# Patient Record
Sex: FEMALE | Race: OTHER | HISPANIC OR LATINO | Employment: OTHER | URBAN - METROPOLITAN AREA
[De-identification: names, ages, dates, MRNs, and addresses within clinical notes are randomized per-mention and may not be internally consistent; named-entity substitution may affect disease eponyms.]

---

## 2023-03-15 LAB
ALANINE AMINOTRANSFERASE (SGPT) (U/L) IN SER/PLAS: 81 U/L (ref 7–45)
ALBUMIN (G/DL) IN SER/PLAS: 4.3 G/DL (ref 3.4–5)
ALKALINE PHOSPHATASE (U/L) IN SER/PLAS: 146 U/L (ref 33–136)
ANION GAP IN SER/PLAS: 12 MMOL/L (ref 10–20)
ASPARTATE AMINOTRANSFERASE (SGOT) (U/L) IN SER/PLAS: 68 U/L (ref 9–39)
BASOPHILS (10*3/UL) IN BLOOD BY AUTOMATED COUNT: 0.03 X10E9/L (ref 0–0.1)
BASOPHILS/100 LEUKOCYTES IN BLOOD BY AUTOMATED COUNT: 0.4 % (ref 0–2)
BILIRUBIN TOTAL (MG/DL) IN SER/PLAS: 0.6 MG/DL (ref 0–1.2)
CALCIUM (MG/DL) IN SER/PLAS: 9.6 MG/DL (ref 8.6–10.3)
CARBON DIOXIDE, TOTAL (MMOL/L) IN SER/PLAS: 27 MMOL/L (ref 21–32)
CHLORIDE (MMOL/L) IN SER/PLAS: 105 MMOL/L (ref 98–107)
CREATININE (MG/DL) IN SER/PLAS: 0.76 MG/DL (ref 0.5–1.05)
EOSINOPHILS (10*3/UL) IN BLOOD BY AUTOMATED COUNT: 0.05 X10E9/L (ref 0–0.7)
EOSINOPHILS/100 LEUKOCYTES IN BLOOD BY AUTOMATED COUNT: 0.6 % (ref 0–6)
ERYTHROCYTE DISTRIBUTION WIDTH (RATIO) BY AUTOMATED COUNT: 14.2 % (ref 11.5–14.5)
ERYTHROCYTE MEAN CORPUSCULAR HEMOGLOBIN CONCENTRATION (G/DL) BY AUTOMATED: 32.3 G/DL (ref 32–36)
ERYTHROCYTE MEAN CORPUSCULAR VOLUME (FL) BY AUTOMATED COUNT: 89 FL (ref 80–100)
ERYTHROCYTES (10*6/UL) IN BLOOD BY AUTOMATED COUNT: 4.67 X10E12/L (ref 4–5.2)
GFR FEMALE: 86 ML/MIN/1.73M2
GLUCOSE (MG/DL) IN SER/PLAS: 86 MG/DL (ref 74–99)
HEMATOCRIT (%) IN BLOOD BY AUTOMATED COUNT: 41.5 % (ref 36–46)
HEMOGLOBIN (G/DL) IN BLOOD: 13.4 G/DL (ref 12–16)
IMMATURE GRANULOCYTES/100 LEUKOCYTES IN BLOOD BY AUTOMATED COUNT: 0.3 % (ref 0–0.9)
LEUKOCYTES (10*3/UL) IN BLOOD BY AUTOMATED COUNT: 7.8 X10E9/L (ref 4.4–11.3)
LYMPHOCYTES (10*3/UL) IN BLOOD BY AUTOMATED COUNT: 1.97 X10E9/L (ref 1.2–4.8)
LYMPHOCYTES/100 LEUKOCYTES IN BLOOD BY AUTOMATED COUNT: 25.4 % (ref 13–44)
MONOCYTES (10*3/UL) IN BLOOD BY AUTOMATED COUNT: 0.56 X10E9/L (ref 0.1–1)
MONOCYTES/100 LEUKOCYTES IN BLOOD BY AUTOMATED COUNT: 7.2 % (ref 2–10)
NEUTROPHILS (10*3/UL) IN BLOOD BY AUTOMATED COUNT: 5.13 X10E9/L (ref 1.2–7.7)
NEUTROPHILS/100 LEUKOCYTES IN BLOOD BY AUTOMATED COUNT: 66.1 % (ref 40–80)
NRBC (PER 100 WBCS) BY AUTOMATED COUNT: 0 /100 WBC (ref 0–0)
PLATELETS (10*3/UL) IN BLOOD AUTOMATED COUNT: 399 X10E9/L (ref 150–450)
POTASSIUM (MMOL/L) IN SER/PLAS: 4.3 MMOL/L (ref 3.5–5.3)
PROTEIN TOTAL: 7.2 G/DL (ref 6.4–8.2)
SODIUM (MMOL/L) IN SER/PLAS: 140 MMOL/L (ref 136–145)
UREA NITROGEN (MG/DL) IN SER/PLAS: 16 MG/DL (ref 6–23)

## 2023-05-18 LAB
MUCUS, URINE: ABNORMAL /LPF
RBC, URINE: 8 /HPF (ref 0–5)
WBC, URINE: 1 /HPF (ref 0–5)

## 2023-06-21 ENCOUNTER — OFFICE VISIT (OUTPATIENT)
Dept: PRIMARY CARE | Facility: CLINIC | Age: 68
End: 2023-06-21
Payer: MEDICARE

## 2023-06-21 VITALS
HEIGHT: 63 IN | BODY MASS INDEX: 21.83 KG/M2 | DIASTOLIC BLOOD PRESSURE: 68 MMHG | SYSTOLIC BLOOD PRESSURE: 122 MMHG | WEIGHT: 123.2 LBS

## 2023-06-21 DIAGNOSIS — F51.01 PRIMARY INSOMNIA: ICD-10-CM

## 2023-06-21 DIAGNOSIS — J45.909 ASTHMA, UNSPECIFIED ASTHMA SEVERITY, UNSPECIFIED WHETHER COMPLICATED, UNSPECIFIED WHETHER PERSISTENT (HHS-HCC): Primary | ICD-10-CM

## 2023-06-21 DIAGNOSIS — I10 BENIGN ESSENTIAL HTN: ICD-10-CM

## 2023-06-21 PROBLEM — N39.3 STRESS INCONTINENCE, FEMALE: Status: ACTIVE | Noted: 2023-06-21

## 2023-06-21 PROBLEM — R16.0 LIVER MASS: Status: ACTIVE | Noted: 2023-06-21

## 2023-06-21 PROBLEM — M54.40 LOW BACK PAIN WITH SCIATICA: Status: ACTIVE | Noted: 2023-06-21

## 2023-06-21 PROBLEM — H11.153 PINGUECULA OF BOTH EYES: Status: ACTIVE | Noted: 2023-06-21

## 2023-06-21 PROBLEM — H16.223 KERATOCONJUNCTIVITIS SICCA OF BOTH EYES NOT DUE TO SJOGREN'S SYNDROME: Status: ACTIVE | Noted: 2023-06-21

## 2023-06-21 PROBLEM — H52.03 HYPERMETROPIA OF BOTH EYES: Status: ACTIVE | Noted: 2023-06-21

## 2023-06-21 PROBLEM — G47.00 INSOMNIA: Status: ACTIVE | Noted: 2023-06-21

## 2023-06-21 PROBLEM — M51.9 ANNULAR TEAR: Status: ACTIVE | Noted: 2023-06-21

## 2023-06-21 PROBLEM — K21.9 CHRONIC GERD: Status: ACTIVE | Noted: 2023-06-21

## 2023-06-21 PROBLEM — R10.32 CHRONIC LLQ PAIN: Status: ACTIVE | Noted: 2023-06-21

## 2023-06-21 PROBLEM — F17.200 CURRENT EVERY DAY SMOKER: Status: ACTIVE | Noted: 2023-06-21

## 2023-06-21 PROBLEM — G89.29 CHRONIC LLQ PAIN: Status: ACTIVE | Noted: 2023-06-21

## 2023-06-21 PROBLEM — R29.898 LOWER EXTREMITY WEAKNESS: Status: ACTIVE | Noted: 2023-06-21

## 2023-06-21 PROBLEM — R63.4 WEIGHT LOSS: Status: ACTIVE | Noted: 2023-06-21

## 2023-06-21 PROBLEM — M13.0 ARTHRITIS OF MULTIPLE SITES: Status: ACTIVE | Noted: 2023-06-21

## 2023-06-21 PROBLEM — H02.88B MEIBOMIAN GLAND DYSFUNCTION (MGD) OF UPPER AND LOWER EYELID OF LEFT EYE: Status: ACTIVE | Noted: 2023-06-21

## 2023-06-21 PROBLEM — E55.9 VITAMIN D DEFICIENCY: Status: ACTIVE | Noted: 2023-06-21

## 2023-06-21 PROBLEM — R10.9 ABDOMINAL PAIN: Status: ACTIVE | Noted: 2023-06-21

## 2023-06-21 PROBLEM — R91.1 LUNG NODULE: Status: ACTIVE | Noted: 2023-06-21

## 2023-06-21 PROBLEM — M85.88 OTHER SPECIFIED DISORDERS OF BONE DENSITY AND STRUCTURE, OTHER SITE: Status: ACTIVE | Noted: 2023-06-21

## 2023-06-21 PROBLEM — F41.9 ANXIETY: Status: ACTIVE | Noted: 2023-06-21

## 2023-06-21 PROBLEM — H02.88A MEIBOMIAN GLAND DYSFUNCTION (MGD) OF UPPER AND LOWER EYELID OF RIGHT EYE: Status: ACTIVE | Noted: 2023-06-21

## 2023-06-21 PROBLEM — R51.9 CHRONIC HEADACHES: Status: ACTIVE | Noted: 2023-06-21

## 2023-06-21 PROBLEM — R73.9 HYPERGLYCEMIA: Status: ACTIVE | Noted: 2023-06-21

## 2023-06-21 PROBLEM — M54.16 LUMBAR RADICULOPATHY: Status: ACTIVE | Noted: 2023-06-21

## 2023-06-21 PROBLEM — H52.13 MYOPIA OF BOTH EYES: Status: ACTIVE | Noted: 2023-06-21

## 2023-06-21 PROBLEM — N39.41 URGE INCONTINENCE OF URINE: Status: ACTIVE | Noted: 2023-06-21

## 2023-06-21 PROBLEM — F17.200 TOBACCO DEPENDENCE SYNDROME: Status: ACTIVE | Noted: 2023-06-21

## 2023-06-21 PROBLEM — H25.13 CATARACT, NUCLEAR SCLEROTIC, BOTH EYES: Status: ACTIVE | Noted: 2023-06-21

## 2023-06-21 PROBLEM — R79.89 ELEVATED LFTS: Status: ACTIVE | Noted: 2023-06-21

## 2023-06-21 PROBLEM — G89.29 CHRONIC HEADACHES: Status: ACTIVE | Noted: 2023-06-21

## 2023-06-21 PROBLEM — E78.5 HYPERLIPIDEMIA: Status: ACTIVE | Noted: 2023-06-21

## 2023-06-21 PROCEDURE — 3074F SYST BP LT 130 MM HG: CPT | Performed by: STUDENT IN AN ORGANIZED HEALTH CARE EDUCATION/TRAINING PROGRAM

## 2023-06-21 PROCEDURE — 1160F RVW MEDS BY RX/DR IN RCRD: CPT | Performed by: STUDENT IN AN ORGANIZED HEALTH CARE EDUCATION/TRAINING PROGRAM

## 2023-06-21 PROCEDURE — 3078F DIAST BP <80 MM HG: CPT | Performed by: STUDENT IN AN ORGANIZED HEALTH CARE EDUCATION/TRAINING PROGRAM

## 2023-06-21 PROCEDURE — 1159F MED LIST DOCD IN RCRD: CPT | Performed by: STUDENT IN AN ORGANIZED HEALTH CARE EDUCATION/TRAINING PROGRAM

## 2023-06-21 PROCEDURE — 99214 OFFICE O/P EST MOD 30 MIN: CPT | Performed by: STUDENT IN AN ORGANIZED HEALTH CARE EDUCATION/TRAINING PROGRAM

## 2023-06-21 RX ORDER — TOPIRAMATE 50 MG/1
50 TABLET, FILM COATED ORAL
COMMUNITY
Start: 2016-11-03 | End: 2023-06-21 | Stop reason: ALTCHOICE

## 2023-06-21 RX ORDER — GABAPENTIN 300 MG/1
CAPSULE ORAL
COMMUNITY
Start: 2016-11-03 | End: 2024-01-29 | Stop reason: ALTCHOICE

## 2023-06-21 RX ORDER — MULTIVIT-MIN/IRON FUM/FOLIC AC 7.5 MG-4
TABLET ORAL
COMMUNITY

## 2023-06-21 RX ORDER — AMLODIPINE BESYLATE 5 MG/1
5 TABLET ORAL DAILY
COMMUNITY
End: 2023-06-21 | Stop reason: SDUPTHER

## 2023-06-21 RX ORDER — OMEPRAZOLE 40 MG/1
40 CAPSULE, DELAYED RELEASE ORAL DAILY
COMMUNITY
End: 2024-01-29 | Stop reason: SDUPTHER

## 2023-06-21 RX ORDER — ZOLPIDEM TARTRATE 10 MG/1
TABLET ORAL
COMMUNITY
End: 2023-06-21 | Stop reason: SDUPTHER

## 2023-06-21 RX ORDER — ATORVASTATIN CALCIUM 40 MG/1
40 TABLET, FILM COATED ORAL NIGHTLY
COMMUNITY
End: 2024-01-29 | Stop reason: SDUPTHER

## 2023-06-21 RX ORDER — BUDESONIDE AND FORMOTEROL FUMARATE DIHYDRATE 160; 4.5 UG/1; UG/1
2 AEROSOL RESPIRATORY (INHALATION) 2 TIMES DAILY
Qty: 30.6 G | Refills: 3 | Status: SHIPPED | OUTPATIENT
Start: 2023-06-21 | End: 2024-06-20

## 2023-06-21 RX ORDER — IBUPROFEN 200 MG
TABLET ORAL
COMMUNITY
Start: 2023-06-01 | End: 2023-06-21 | Stop reason: ALTCHOICE

## 2023-06-21 RX ORDER — OXYCODONE AND ACETAMINOPHEN 5; 325 MG/1; MG/1
TABLET ORAL EVERY 6 HOURS PRN
COMMUNITY
Start: 2021-04-17 | End: 2023-06-21 | Stop reason: ALTCHOICE

## 2023-06-21 RX ORDER — FLUOXETINE HYDROCHLORIDE 20 MG/1
60 CAPSULE ORAL DAILY
COMMUNITY
End: 2024-01-29 | Stop reason: SDUPTHER

## 2023-06-21 RX ORDER — ENALAPRIL MALEATE 20 MG/1
20 TABLET ORAL DAILY
Qty: 90 TABLET | Refills: 3 | Status: SHIPPED | OUTPATIENT
Start: 2023-06-21 | End: 2024-01-29 | Stop reason: SDUPTHER

## 2023-06-21 RX ORDER — ALBUTEROL SULFATE 90 UG/1
AEROSOL, METERED RESPIRATORY (INHALATION)
COMMUNITY
Start: 2017-10-30 | End: 2023-06-21 | Stop reason: SDUPTHER

## 2023-06-21 RX ORDER — DICLOFENAC SODIUM 75 MG/1
75 TABLET, DELAYED RELEASE ORAL DAILY
COMMUNITY
Start: 2021-07-27 | End: 2024-01-29 | Stop reason: ALTCHOICE

## 2023-06-21 RX ORDER — AMLODIPINE BESYLATE 5 MG/1
5 TABLET ORAL DAILY
Qty: 90 TABLET | Refills: 3 | Status: SHIPPED | OUTPATIENT
Start: 2023-06-21 | End: 2024-01-29 | Stop reason: SDUPTHER

## 2023-06-21 RX ORDER — BUDESONIDE AND FORMOTEROL FUMARATE DIHYDRATE 160; 4.5 UG/1; UG/1
2 AEROSOL RESPIRATORY (INHALATION) 2 TIMES DAILY
COMMUNITY
End: 2023-06-21 | Stop reason: SDUPTHER

## 2023-06-21 RX ORDER — ALBUTEROL SULFATE 90 UG/1
2 AEROSOL, METERED RESPIRATORY (INHALATION) EVERY 6 HOURS PRN
Qty: 18 G | Refills: 2 | Status: SHIPPED | OUTPATIENT
Start: 2023-06-21 | End: 2024-04-24

## 2023-06-21 RX ORDER — OLANZAPINE 10 MG/1
1 TABLET ORAL NIGHTLY
COMMUNITY
Start: 2016-11-03 | End: 2024-01-29 | Stop reason: SDUPTHER

## 2023-06-21 RX ORDER — ZOLPIDEM TARTRATE 10 MG/1
TABLET ORAL
Qty: 90 TABLET | Refills: 1 | Status: SHIPPED | OUTPATIENT
Start: 2023-06-21 | End: 2023-12-18

## 2023-06-21 RX ORDER — MELOXICAM 7.5 MG/1
1 TABLET ORAL DAILY
COMMUNITY
Start: 2017-05-03 | End: 2024-01-29 | Stop reason: ALTCHOICE

## 2023-06-21 RX ORDER — METHYLPREDNISOLONE 4 MG/1
TABLET ORAL
COMMUNITY
Start: 2021-04-17 | End: 2023-06-21 | Stop reason: ALTCHOICE

## 2023-06-21 RX ORDER — ENALAPRIL MALEATE 20 MG/1
1 TABLET ORAL DAILY
COMMUNITY
Start: 2019-05-20 | End: 2023-06-21 | Stop reason: SDUPTHER

## 2023-06-21 ASSESSMENT — PAIN SCALES - GENERAL: PAINLEVEL: 0-NO PAIN

## 2023-06-21 NOTE — PROGRESS NOTES
"Subjective   Patient ID: Taylor Cardoso is a 68 y.o. female who presents for Med Refill (Medication review and refills).    HPI comes in for 6-month follow-up for insomnia.  Overall no changes    Review of Systems  Constitutional: NO F, chills, or sweats  Eyes: no blurred vision or visual disturbance  ENT: no hearing loss, no congestion, no nasal discharge, no hoarseness and no sore throat.   Cardiovascular: no chest pain, no edema, no palps and no syncope.   Respiratory: no cough,no s.o.b. and no wheezing  Gastrointestinal: no abdominal pain, No C/D no N/V, no blood in stools  Genitourinary: no dysuria, no change in urinary frequency, no urinary hesitancy and no feelings of urinary urgency.   Musculoskeletal: no arthralgias,  no back pain and no myalgias.   Integumentary: no new skin lesions and no rashes.   Neurological: no difficulty walking, no headache, no limb weakness, no numbness and no tingling.   Psychiatric: Chronic insomnia stable on meds  Objective   /68 (BP Location: Left arm, Patient Position: Sitting, BP Cuff Size: Adult)   Ht 1.6 m (5' 3\")   Wt 55.9 kg (123 lb 3.2 oz)   BMI 21.82 kg/m²     Physical Exam  gen- a & o x 3, nad, pleasant  heent- eomi, perrla, ear canals patent, TM's non-erythematous, no fluid, frontal and maxillary sinus's nontender  neck- supple, nontender, no palpable or enlarged nodes, no thyromegaly  heart- rrr, no murmurs  lungs- cta b/l , no w/r/r  chest- symmetric, nontender  ab- soft, nontender, no palpable organomegaly, postive bowel sounds  ex's- no c/c/e  neuro- CNs 2-12 grossly intact, full sensation and strength in all extremities    Assessment/Plan     1.  6-month follow-up for insomnia.  Med refill today.  [place]AR[place] report reviewed.  She is up-to-date with drug screen, and patient agreement forms as of December 2022.  You have been prescribed a schedule 4 controlled substance for sleep, examples are ambien, sonata, restoril, lunesta, and belsomra. The long term use " of this medication can be addicting, however is needed in some conditions. By law, you can be prescribed a 1 month supply with 5 refills. After that if refills are needed there will be a required office visit, there will be no exceptions. You will be drug tested on a regular basis. A prescriber report will be ran each time you need a refill. You may not use any illegal drugs, and may not get controlled medications prescribed from more than 1 doctor. If there are any abnormalities on your drug testing, and if any other physicians are found to have been prescribing you controlled substances, our office will no longer help with refills in the future. There will be no exceptions. It is your responsibility to take your medications properly, it is your responsibility to make office visits in advance when refills may be needed, and it is your responsibility to not take any illegal drugs, as well as any controlled medications from another doctor. If you fail in your responsibilities, our office will discontinue your medication with no questions and no exceptions.  If you feel that you are addicted to your medication and would like to improve yourself and cut back, you may contact the [place] [facility], Their number [phone] toll-free. Your call is confidential. They will refer you to someone for help. You can call them from Monday through Friday between 8 a.m. and 5 p.m.    Additional med refills for hypertension and asthma.    Follow-up in 6 months for wellness visit

## 2023-06-21 NOTE — PATIENT INSTRUCTIONS
1.  6-month follow-up for insomnia.  Med refill today.  OARRS report reviewed.  She is up-to-date with drug screen, and patient agreement forms as of December 2022.  You have been prescribed a schedule 4 controlled substance for sleep, examples are ambien, sonata, restoril, lunesta, and belsomra. The long term use of this medication can be addicting, however is needed in some conditions. By law, you can be prescribed a 1 month supply with 5 refills. After that if refills are needed there will be a required office visit, there will be no exceptions. You will be drug tested on a regular basis. A prescriber report will be ran each time you need a refill. You may not use any illegal drugs, and may not get controlled medications prescribed from more than 1 doctor. If there are any abnormalities on your drug testing, and if any other physicians are found to have been prescribing you controlled substances, our office will no longer help with refills in the future. There will be no exceptions. It is your responsibility to take your medications properly, it is your responsibility to make office visits in advance when refills may be needed, and it is your responsibility to not take any illegal drugs, as well as any controlled medications from another doctor. If you fail in your responsibilities, our office will discontinue your medication with no questions and no exceptions.  If you feel that you are addicted to your medication and would like to improve yourself and cut back, you may contact the [place] [facility], Their number [phone] toll-free. Your call is confidential. They will refer you to someone for help. You can call them from Monday through Friday between 8 a.m. and 5 p.m.    Additional med refills for hypertension and asthma.    Follow-up in 6 months for wellness visit

## 2023-11-01 ENCOUNTER — NURSE TRIAGE (OUTPATIENT)
Dept: PRIMARY CARE | Facility: CLINIC | Age: 68
End: 2023-11-01
Payer: MEDICARE

## 2023-12-18 DIAGNOSIS — F51.01 PRIMARY INSOMNIA: ICD-10-CM

## 2023-12-18 RX ORDER — NICOTINE 10 MG/ML
SPRAY, METERED NASAL
COMMUNITY
Start: 2023-07-11 | End: 2024-01-29 | Stop reason: ALTCHOICE

## 2023-12-18 RX ORDER — NICOTINE 7 MG/24H
1 PATCH, EXTENDED RELEASE TRANSDERMAL DAILY
COMMUNITY
Start: 2023-08-11 | End: 2024-01-29 | Stop reason: ALTCHOICE

## 2023-12-18 RX ORDER — ZOLPIDEM TARTRATE 10 MG/1
TABLET ORAL
Qty: 30 TABLET | Refills: 0 | Status: SHIPPED | OUTPATIENT
Start: 2023-12-18 | End: 2024-01-29 | Stop reason: SDUPTHER

## 2024-01-29 ENCOUNTER — OFFICE VISIT (OUTPATIENT)
Dept: PRIMARY CARE | Facility: CLINIC | Age: 69
End: 2024-01-29
Payer: MEDICARE

## 2024-01-29 VITALS
DIASTOLIC BLOOD PRESSURE: 56 MMHG | WEIGHT: 121.2 LBS | HEART RATE: 81 BPM | OXYGEN SATURATION: 95 % | SYSTOLIC BLOOD PRESSURE: 106 MMHG | BODY MASS INDEX: 21.47 KG/M2

## 2024-01-29 DIAGNOSIS — Z51.81 ENCOUNTER FOR THERAPEUTIC DRUG LEVEL MONITORING: ICD-10-CM

## 2024-01-29 DIAGNOSIS — Z13.29 SCREENING FOR THYROID DISORDER: ICD-10-CM

## 2024-01-29 DIAGNOSIS — E55.9 VITAMIN D DEFICIENCY: ICD-10-CM

## 2024-01-29 DIAGNOSIS — R73.9 HYPERGLYCEMIA: ICD-10-CM

## 2024-01-29 DIAGNOSIS — I10 BENIGN ESSENTIAL HTN: ICD-10-CM

## 2024-01-29 DIAGNOSIS — Z00.00 WELLNESS EXAMINATION: Primary | ICD-10-CM

## 2024-01-29 DIAGNOSIS — F41.9 ANXIETY: ICD-10-CM

## 2024-01-29 DIAGNOSIS — Z12.31 ENCOUNTER FOR SCREENING MAMMOGRAM FOR MALIGNANT NEOPLASM OF BREAST: ICD-10-CM

## 2024-01-29 DIAGNOSIS — I10 ESSENTIAL HYPERTENSION WITH GOAL BLOOD PRESSURE LESS THAN 130/80: ICD-10-CM

## 2024-01-29 DIAGNOSIS — K21.9 GASTROESOPHAGEAL REFLUX DISEASE, UNSPECIFIED WHETHER ESOPHAGITIS PRESENT: ICD-10-CM

## 2024-01-29 DIAGNOSIS — Z13.6 ENCOUNTER FOR SCREENING FOR CARDIOVASCULAR DISORDERS: ICD-10-CM

## 2024-01-29 DIAGNOSIS — F51.01 PRIMARY INSOMNIA: ICD-10-CM

## 2024-01-29 DIAGNOSIS — R91.8 MULTIPLE PULMONARY NODULES: ICD-10-CM

## 2024-01-29 DIAGNOSIS — E78.5 HYPERLIPIDEMIA, UNSPECIFIED HYPERLIPIDEMIA TYPE: ICD-10-CM

## 2024-01-29 DIAGNOSIS — Z13.0 SCREENING FOR DEFICIENCY ANEMIA: ICD-10-CM

## 2024-01-29 PROBLEM — M62.81 MUSCLE WEAKNESS OF EXTREMITY: Status: ACTIVE | Noted: 2023-06-21

## 2024-01-29 PROBLEM — M19.90 ARTHRITIS: Status: ACTIVE | Noted: 2023-06-21

## 2024-01-29 PROBLEM — N39.0 URINARY TRACT INFECTION: Status: ACTIVE | Noted: 2024-01-29

## 2024-01-29 PROBLEM — R53.83 FATIGUE: Status: ACTIVE | Noted: 2024-01-29

## 2024-01-29 PROBLEM — M89.9 DISORDER OF BONE: Status: ACTIVE | Noted: 2023-06-21

## 2024-01-29 PROBLEM — Z86.79 HISTORY OF HYPERTENSION: Status: ACTIVE | Noted: 2024-01-29

## 2024-01-29 PROBLEM — H16.229 KERATOCONJUNCTIVITIS SICCA NOT DUE TO SJOGREN'S SYNDROME: Status: ACTIVE | Noted: 2020-10-15

## 2024-01-29 PROBLEM — H52.03 HYPEROPIA OF BOTH EYES: Status: ACTIVE | Noted: 2020-10-15

## 2024-01-29 PROBLEM — H25.10 NUCLEAR SCLEROTIC CATARACT: Status: ACTIVE | Noted: 2020-10-15

## 2024-01-29 PROBLEM — H02.883 MEIBOMIAN GLAND DYSFUNCTION (MGD) OF RIGHT EYE: Status: ACTIVE | Noted: 2020-10-15

## 2024-01-29 PROCEDURE — 99214 OFFICE O/P EST MOD 30 MIN: CPT | Performed by: STUDENT IN AN ORGANIZED HEALTH CARE EDUCATION/TRAINING PROGRAM

## 2024-01-29 PROCEDURE — G0439 PPPS, SUBSEQ VISIT: HCPCS | Performed by: STUDENT IN AN ORGANIZED HEALTH CARE EDUCATION/TRAINING PROGRAM

## 2024-01-29 PROCEDURE — 1159F MED LIST DOCD IN RCRD: CPT | Performed by: STUDENT IN AN ORGANIZED HEALTH CARE EDUCATION/TRAINING PROGRAM

## 2024-01-29 PROCEDURE — 3074F SYST BP LT 130 MM HG: CPT | Performed by: STUDENT IN AN ORGANIZED HEALTH CARE EDUCATION/TRAINING PROGRAM

## 2024-01-29 PROCEDURE — 1170F FXNL STATUS ASSESSED: CPT | Performed by: STUDENT IN AN ORGANIZED HEALTH CARE EDUCATION/TRAINING PROGRAM

## 2024-01-29 PROCEDURE — 3078F DIAST BP <80 MM HG: CPT | Performed by: STUDENT IN AN ORGANIZED HEALTH CARE EDUCATION/TRAINING PROGRAM

## 2024-01-29 PROCEDURE — 1160F RVW MEDS BY RX/DR IN RCRD: CPT | Performed by: STUDENT IN AN ORGANIZED HEALTH CARE EDUCATION/TRAINING PROGRAM

## 2024-01-29 PROCEDURE — 1126F AMNT PAIN NOTED NONE PRSNT: CPT | Performed by: STUDENT IN AN ORGANIZED HEALTH CARE EDUCATION/TRAINING PROGRAM

## 2024-01-29 RX ORDER — OLANZAPINE 10 MG/1
10 TABLET ORAL NIGHTLY
Qty: 90 TABLET | Refills: 3 | Status: SHIPPED | OUTPATIENT
Start: 2024-01-29 | End: 2025-01-28

## 2024-01-29 RX ORDER — ZOLPIDEM TARTRATE 10 MG/1
TABLET ORAL
Qty: 90 TABLET | Refills: 1 | Status: SHIPPED | OUTPATIENT
Start: 2024-01-29 | End: 2024-04-24 | Stop reason: ALTCHOICE

## 2024-01-29 RX ORDER — ASPIRIN 81 MG/1
81 TABLET ORAL
COMMUNITY

## 2024-01-29 RX ORDER — OMEPRAZOLE 40 MG/1
40 CAPSULE, DELAYED RELEASE ORAL DAILY
Qty: 90 CAPSULE | Refills: 3 | Status: SHIPPED | OUTPATIENT
Start: 2024-01-29 | End: 2025-01-28

## 2024-01-29 RX ORDER — FLUOXETINE HYDROCHLORIDE 20 MG/1
60 CAPSULE ORAL DAILY
Qty: 270 CAPSULE | Refills: 3 | Status: SHIPPED | OUTPATIENT
Start: 2024-01-29 | End: 2024-04-18 | Stop reason: ALTCHOICE

## 2024-01-29 RX ORDER — ATORVASTATIN CALCIUM 40 MG/1
40 TABLET, FILM COATED ORAL NIGHTLY
Qty: 90 TABLET | Refills: 3 | Status: SHIPPED | OUTPATIENT
Start: 2024-01-29 | End: 2025-01-28

## 2024-01-29 RX ORDER — AMLODIPINE BESYLATE 5 MG/1
5 TABLET ORAL DAILY
Qty: 90 TABLET | Refills: 3 | Status: SHIPPED | OUTPATIENT
Start: 2024-01-29 | End: 2025-01-28

## 2024-01-29 RX ORDER — ENALAPRIL MALEATE 20 MG/1
20 TABLET ORAL DAILY
Qty: 90 TABLET | Refills: 3 | Status: SHIPPED | OUTPATIENT
Start: 2024-01-29 | End: 2025-01-28

## 2024-01-29 ASSESSMENT — ENCOUNTER SYMPTOMS: DEPRESSION: 0

## 2024-01-29 ASSESSMENT — PAIN SCALES - GENERAL: PAINLEVEL: 0-NO PAIN

## 2024-01-29 NOTE — PROGRESS NOTES
Subjective   Patient ID: Taylor Cardoso is a 68 y.o. female who presents for Medicare Annual Wellness Visit Subsequent (She is not fasting.) and Med Refill.    HPI comes in for Medicare wellness.  Med refills    Review of Systems  Constitutional: NO F, chills, or sweats  Eyes: no blurred vision or visual disturbance  ENT: no hearing loss, no congestion, no nasal discharge, no hoarseness and no sore throat.   Cardiovascular: no chest pain, no edema, no palps and no syncope.   Respiratory: no cough,no s.o.b. and no wheezing  Gastrointestinal: no abdominal pain, No C/D no N/V, no blood in stools  Genitourinary: no dysuria, no change in urinary frequency, no urinary hesitancy and no feelings of urinary urgency.   Musculoskeletal: no arthralgias,  no back pain and no myalgias.   Integumentary: no new skin lesions and no rashes.   Neurological: no difficulty walking, no headache, no limb weakness, no numbness and no tingling.   Psychiatric: Chronic insomnia  Endocrine: no recent weight gain and no recent weight loss.   Hematologic/Lymphatic: no tendency for easy bruising and no swollen glands.  Objective   /56 (BP Location: Right arm, Patient Position: Sitting, BP Cuff Size: Adult)   Pulse 81   Wt 55 kg (121 lb 3.2 oz)   SpO2 95%   BMI 21.47 kg/m²     Physical Exam  gen- a & o x 3, nad, pleasant  heent- eomi, perrla, ear canals patent, TM's non-erythematous, no fluid, frontal and maxillary sinus's nontender  neck- supple, nontender, no palpable or enlarged nodes, no thyromegaly  heart- rrr, no murmurs  lungs- cta b/l , no w/r/r  chest- symmetric, nontender  ab- soft, nontender, no palpable organomegaly, postive bowel sounds  ex's- no c/c/e  neuro- CNs 2-12 grossly intact, full sensation and strength in all extremities    Assessment/Plan        1.  Medicare wellness.  No concerns on exam.  Screening labs ordered today please be fasting.  She is up-to-date with GYN she had mammogram bone density scan.  Colonoscopy  was 2018.  Check with your pharmacy if you have had Prevnar 20 we would advise if you have not yet.  She is also established with the lung cancer screening clinic for once per year screenings.    2. 6-month follow-up for insomnia.  Med refill today.  OARRS report reviewed.  Drug screen and patient agreement forms today  You have been prescribed a schedule 4 controlled substance for sleep, examples are ambien, sonata, restoril, lunesta, and belsomra. The long term use of this medication can be addicting, however is needed in some conditions. By law, you can be prescribed a 1 month supply with 5 refills. After that if refills are needed there will be a required office visit, there will be no exceptions. You will be drug tested on a regular basis. A prescriber report will be ran each time you need a refill. You may not use any illegal drugs, and may not get controlled medications prescribed from more than 1 doctor. If there are any abnormalities on your drug testing, and if any other physicians are found to have been prescribing you controlled substances, our office will no longer help with refills in the future. There will be no exceptions. It is your responsibility to take your medications properly, it is your responsibility to make office visits in advance when refills may be needed, and it is your responsibility to not take any illegal drugs, as well as any controlled medications from another doctor. If you fail in your responsibilities, our office will discontinue your medication with no questions and no exceptions.  If you feel that you are addicted to your medication and would like to improve yourself and cut back, you may contact the [place] [facility], Their number [phone] toll-free. Your call is confidential. They will refer you to someone for help. You can call them from Monday through Friday between 8 a.m. and 5 p.m.

## 2024-01-29 NOTE — PATIENT INSTRUCTIONS
1.  Medicare wellness.  No concerns on exam.  Screening labs ordered today please be fasting.  She is up-to-date with GYN she had mammogram bone density scan.  Colonoscopy was 2018.  Check with your pharmacy if you have had Prevnar 20 we would advise if you have not yet.  She is also established with the lung cancer screening clinic for once per year screenings.    2. 6-month follow-up for insomnia.  Med refill today.  OARRS report reviewed.  Drug screen and patient agreement forms today  You have been prescribed a schedule 4 controlled substance for sleep, examples are ambien, sonata, restoril, lunesta, and belsomra. The long term use of this medication can be addicting, however is needed in some conditions. By law, you can be prescribed a 1 month supply with 5 refills. After that if refills are needed there will be a required office visit, there will be no exceptions. You will be drug tested on a regular basis. A prescriber report will be ran each time you need a refill. You may not use any illegal drugs, and may not get controlled medications prescribed from more than 1 doctor. If there are any abnormalities on your drug testing, and if any other physicians are found to have been prescribing you controlled substances, our office will no longer help with refills in the future. There will be no exceptions. It is your responsibility to take your medications properly, it is your responsibility to make office visits in advance when refills may be needed, and it is your responsibility to not take any illegal drugs, as well as any controlled medications from another doctor. If you fail in your responsibilities, our office will discontinue your medication with no questions and no exceptions.  If you feel that you are addicted to your medication and would like to improve yourself and cut back, you may contact the [place] [facility], Their number [phone] toll-free. Your call is  confidential. They will refer you to someone for help. You can call them from Monday through Friday between 8 a.m. and 5 p.m.

## 2024-01-30 ASSESSMENT — ENCOUNTER SYMPTOMS
LOSS OF SENSATION IN FEET: 0
OCCASIONAL FEELINGS OF UNSTEADINESS: 0

## 2024-01-30 ASSESSMENT — PATIENT HEALTH QUESTIONNAIRE - PHQ9
SUM OF ALL RESPONSES TO PHQ9 QUESTIONS 1 AND 2: 0
2. FEELING DOWN, DEPRESSED OR HOPELESS: NOT AT ALL
1. LITTLE INTEREST OR PLEASURE IN DOING THINGS: NOT AT ALL

## 2024-01-30 ASSESSMENT — ACTIVITIES OF DAILY LIVING (ADL)
BATHING: INDEPENDENT
MANAGING_FINANCES: INDEPENDENT
DOING_HOUSEWORK: INDEPENDENT
GROCERY_SHOPPING: INDEPENDENT
DRESSING: INDEPENDENT
TAKING_MEDICATION: INDEPENDENT

## 2024-02-08 ENCOUNTER — LAB (OUTPATIENT)
Dept: LAB | Facility: LAB | Age: 69
End: 2024-02-08
Payer: MEDICARE

## 2024-02-08 DIAGNOSIS — I10 ESSENTIAL HYPERTENSION WITH GOAL BLOOD PRESSURE LESS THAN 130/80: ICD-10-CM

## 2024-02-08 DIAGNOSIS — Z13.29 SCREENING FOR THYROID DISORDER: ICD-10-CM

## 2024-02-08 DIAGNOSIS — E78.5 HYPERLIPIDEMIA, UNSPECIFIED HYPERLIPIDEMIA TYPE: ICD-10-CM

## 2024-02-08 DIAGNOSIS — Z12.31 ENCOUNTER FOR SCREENING MAMMOGRAM FOR MALIGNANT NEOPLASM OF BREAST: ICD-10-CM

## 2024-02-08 DIAGNOSIS — Z00.00 WELLNESS EXAMINATION: ICD-10-CM

## 2024-02-08 DIAGNOSIS — E55.9 VITAMIN D DEFICIENCY: ICD-10-CM

## 2024-02-08 DIAGNOSIS — Z13.0 SCREENING FOR DEFICIENCY ANEMIA: ICD-10-CM

## 2024-02-08 DIAGNOSIS — R73.9 HYPERGLYCEMIA: ICD-10-CM

## 2024-02-08 DIAGNOSIS — Z13.6 ENCOUNTER FOR SCREENING FOR CARDIOVASCULAR DISORDERS: ICD-10-CM

## 2024-02-08 DIAGNOSIS — K21.9 GASTROESOPHAGEAL REFLUX DISEASE, UNSPECIFIED WHETHER ESOPHAGITIS PRESENT: ICD-10-CM

## 2024-02-08 DIAGNOSIS — R91.8 MULTIPLE PULMONARY NODULES: ICD-10-CM

## 2024-02-08 DIAGNOSIS — Z51.81 ENCOUNTER FOR THERAPEUTIC DRUG LEVEL MONITORING: ICD-10-CM

## 2024-02-08 LAB
25(OH)D3 SERPL-MCNC: 39 NG/ML (ref 30–100)
ALBUMIN SERPL BCP-MCNC: 4.2 G/DL (ref 3.4–5)
ALP SERPL-CCNC: 144 U/L (ref 33–136)
ALT SERPL W P-5'-P-CCNC: 82 U/L (ref 7–45)
AMPHETAMINES UR QL SCN: NORMAL
ANION GAP SERPL CALC-SCNC: 12 MMOL/L (ref 10–20)
AST SERPL W P-5'-P-CCNC: 89 U/L (ref 9–39)
BARBITURATES UR QL SCN: NORMAL
BASOPHILS # BLD AUTO: 0.04 X10*3/UL (ref 0–0.1)
BASOPHILS NFR BLD AUTO: 0.6 %
BENZODIAZ UR QL SCN: NORMAL
BILIRUB SERPL-MCNC: 0.6 MG/DL (ref 0–1.2)
BUN SERPL-MCNC: 17 MG/DL (ref 6–23)
BZE UR QL SCN: NORMAL
CALCIUM SERPL-MCNC: 9.1 MG/DL (ref 8.6–10.3)
CANNABINOIDS UR QL SCN: NORMAL
CHLORIDE SERPL-SCNC: 107 MMOL/L (ref 98–107)
CHOLEST SERPL-MCNC: 160 MG/DL (ref 0–199)
CHOLESTEROL/HDL RATIO: 3.3
CO2 SERPL-SCNC: 26 MMOL/L (ref 21–32)
CREAT SERPL-MCNC: 0.74 MG/DL (ref 0.5–1.05)
EGFRCR SERPLBLD CKD-EPI 2021: 89 ML/MIN/1.73M*2
EOSINOPHIL # BLD AUTO: 0.06 X10*3/UL (ref 0–0.7)
EOSINOPHIL NFR BLD AUTO: 0.9 %
ERYTHROCYTE [DISTWIDTH] IN BLOOD BY AUTOMATED COUNT: 14.2 % (ref 11.5–14.5)
EST. AVERAGE GLUCOSE BLD GHB EST-MCNC: 128 MG/DL
FENTANYL+NORFENTANYL UR QL SCN: NORMAL
GLUCOSE SERPL-MCNC: 93 MG/DL (ref 74–99)
HBA1C MFR BLD: 6.1 %
HCT VFR BLD AUTO: 38.5 % (ref 36–46)
HDLC SERPL-MCNC: 48.9 MG/DL
HGB BLD-MCNC: 13.1 G/DL (ref 12–16)
IMM GRANULOCYTES # BLD AUTO: 0.01 X10*3/UL (ref 0–0.7)
IMM GRANULOCYTES NFR BLD AUTO: 0.1 % (ref 0–0.9)
LDLC SERPL CALC-MCNC: 97 MG/DL
LYMPHOCYTES # BLD AUTO: 2.34 X10*3/UL (ref 1.2–4.8)
LYMPHOCYTES NFR BLD AUTO: 34.7 %
MCH RBC QN AUTO: 30 PG (ref 26–34)
MCHC RBC AUTO-ENTMCNC: 34 G/DL (ref 32–36)
MCV RBC AUTO: 88 FL (ref 80–100)
MONOCYTES # BLD AUTO: 0.44 X10*3/UL (ref 0.1–1)
MONOCYTES NFR BLD AUTO: 6.5 %
NEUTROPHILS # BLD AUTO: 3.85 X10*3/UL (ref 1.2–7.7)
NEUTROPHILS NFR BLD AUTO: 57.2 %
NON HDL CHOLESTEROL: 111 MG/DL (ref 0–149)
NRBC BLD-RTO: 0 /100 WBCS (ref 0–0)
OPIATES UR QL SCN: NORMAL
OXYCODONE+OXYMORPHONE UR QL SCN: NORMAL
PCP UR QL SCN: NORMAL
PLATELET # BLD AUTO: 411 X10*3/UL (ref 150–450)
POTASSIUM SERPL-SCNC: 4.2 MMOL/L (ref 3.5–5.3)
PROT SERPL-MCNC: 6.6 G/DL (ref 6.4–8.2)
RBC # BLD AUTO: 4.36 X10*6/UL (ref 4–5.2)
SODIUM SERPL-SCNC: 141 MMOL/L (ref 136–145)
TRIGL SERPL-MCNC: 73 MG/DL (ref 0–149)
TSH SERPL-ACNC: 2.19 MIU/L (ref 0.44–3.98)
VIT B12 SERPL-MCNC: 640 PG/ML (ref 211–911)
VLDL: 15 MG/DL (ref 0–40)
WBC # BLD AUTO: 6.7 X10*3/UL (ref 4.4–11.3)

## 2024-02-08 PROCEDURE — 36415 COLL VENOUS BLD VENIPUNCTURE: CPT

## 2024-02-08 PROCEDURE — 82607 VITAMIN B-12: CPT

## 2024-02-08 PROCEDURE — 80061 LIPID PANEL: CPT

## 2024-02-08 PROCEDURE — 80307 DRUG TEST PRSMV CHEM ANLYZR: CPT

## 2024-02-08 PROCEDURE — 82306 VITAMIN D 25 HYDROXY: CPT

## 2024-02-08 PROCEDURE — 80053 COMPREHEN METABOLIC PANEL: CPT

## 2024-02-08 PROCEDURE — 83036 HEMOGLOBIN GLYCOSYLATED A1C: CPT

## 2024-02-08 PROCEDURE — 84443 ASSAY THYROID STIM HORMONE: CPT

## 2024-02-08 PROCEDURE — 85025 COMPLETE CBC W/AUTO DIFF WBC: CPT

## 2024-02-12 DIAGNOSIS — R16.0 LIVER MASS: Primary | ICD-10-CM

## 2024-02-13 ENCOUNTER — TELEPHONE (OUTPATIENT)
Dept: PRIMARY CARE | Facility: CLINIC | Age: 69
End: 2024-02-13
Payer: MEDICARE

## 2024-02-13 NOTE — TELEPHONE ENCOUNTER
This is a message from the message center.        Pt called after hours, today Dr Shook told her to have an xray for her abnormal liver enzymes. He put in Epic an order for an ultrasound upper quadrant. I am asking him to verify that he wants this test and not a Fibroscan or an ultrasound of the liver. Thank you.

## 2024-02-15 ENCOUNTER — HOSPITAL ENCOUNTER (OUTPATIENT)
Dept: RADIOLOGY | Facility: CLINIC | Age: 69
Discharge: HOME | End: 2024-02-15
Payer: MEDICARE

## 2024-02-15 DIAGNOSIS — R16.0 LIVER MASS: ICD-10-CM

## 2024-02-15 PROCEDURE — 76705 ECHO EXAM OF ABDOMEN: CPT

## 2024-02-15 PROCEDURE — 76705 ECHO EXAM OF ABDOMEN: CPT | Performed by: STUDENT IN AN ORGANIZED HEALTH CARE EDUCATION/TRAINING PROGRAM

## 2024-02-22 ENCOUNTER — TELEPHONE (OUTPATIENT)
Dept: PRIMARY CARE | Facility: CLINIC | Age: 69
End: 2024-02-22
Payer: MEDICARE

## 2024-02-22 NOTE — TELEPHONE ENCOUNTER
Pt called and states that she got her CT results and would like to know if you could give her a call and help her understand them.

## 2024-04-05 ENCOUNTER — TELEPHONE (OUTPATIENT)
Dept: PRIMARY CARE | Facility: CLINIC | Age: 69
End: 2024-04-05
Payer: MEDICARE

## 2024-04-08 ENCOUNTER — HOSPITAL ENCOUNTER (OUTPATIENT)
Dept: RADIOLOGY | Facility: CLINIC | Age: 69
End: 2024-04-08
Payer: MEDICARE

## 2024-04-09 ENCOUNTER — APPOINTMENT (OUTPATIENT)
Dept: PRIMARY CARE | Facility: CLINIC | Age: 69
End: 2024-04-09
Payer: MEDICARE

## 2024-04-11 ENCOUNTER — HOSPITAL ENCOUNTER (OUTPATIENT)
Dept: RADIOLOGY | Facility: CLINIC | Age: 69
Discharge: HOME | End: 2024-04-11
Payer: MEDICARE

## 2024-04-11 DIAGNOSIS — R91.8 OTHER NONSPECIFIC ABNORMAL FINDING OF LUNG FIELD: ICD-10-CM

## 2024-04-11 PROCEDURE — 71271 CT THORAX LUNG CANCER SCR C-: CPT

## 2024-04-18 ENCOUNTER — TELEPHONE (OUTPATIENT)
Dept: PRIMARY CARE | Facility: CLINIC | Age: 69
End: 2024-04-18
Payer: MEDICARE

## 2024-04-18 ENCOUNTER — TELEMEDICINE (OUTPATIENT)
Dept: PRIMARY CARE | Facility: CLINIC | Age: 69
End: 2024-04-18
Payer: MEDICARE

## 2024-04-18 DIAGNOSIS — R91.8 LUNG NODULES: Primary | ICD-10-CM

## 2024-04-18 DIAGNOSIS — F17.200 SMOKER: ICD-10-CM

## 2024-04-18 PROCEDURE — 99212 OFFICE O/P EST SF 10 MIN: CPT | Mod: ZK,95 | Performed by: NURSE PRACTITIONER

## 2024-04-18 SDOH — ECONOMIC STABILITY: FOOD INSECURITY: WITHIN THE PAST 12 MONTHS, THE FOOD YOU BOUGHT JUST DIDN'T LAST AND YOU DIDN'T HAVE MONEY TO GET MORE.: NEVER TRUE

## 2024-04-18 SDOH — ECONOMIC STABILITY: FOOD INSECURITY: WITHIN THE PAST 12 MONTHS, YOU WORRIED THAT YOUR FOOD WOULD RUN OUT BEFORE YOU GOT MONEY TO BUY MORE.: NEVER TRUE

## 2024-04-18 ASSESSMENT — ENCOUNTER SYMPTOMS
LOSS OF SENSATION IN FEET: 0
DEPRESSION: 1
OCCASIONAL FEELINGS OF UNSTEADINESS: 0

## 2024-04-18 ASSESSMENT — LIFESTYLE VARIABLES
HOW OFTEN DO YOU HAVE A DRINK CONTAINING ALCOHOL: NEVER
AUDIT-C TOTAL SCORE: 0
HOW MANY STANDARD DRINKS CONTAINING ALCOHOL DO YOU HAVE ON A TYPICAL DAY: PATIENT DOES NOT DRINK
HOW OFTEN DO YOU HAVE SIX OR MORE DRINKS ON ONE OCCASION: NEVER
SKIP TO QUESTIONS 9-10: 1

## 2024-04-18 ASSESSMENT — COLUMBIA-SUICIDE SEVERITY RATING SCALE - C-SSRS
1. IN THE PAST MONTH, HAVE YOU WISHED YOU WERE DEAD OR WISHED YOU COULD GO TO SLEEP AND NOT WAKE UP?: NO
2. HAVE YOU ACTUALLY HAD ANY THOUGHTS OF KILLING YOURSELF?: NO
6. HAVE YOU EVER DONE ANYTHING, STARTED TO DO ANYTHING, OR PREPARED TO DO ANYTHING TO END YOUR LIFE?: NO

## 2024-04-18 ASSESSMENT — PAIN SCALES - GENERAL: PAINLEVEL: 0-NO PAIN

## 2024-04-18 NOTE — PATIENT INSTRUCTIONS
Stable 3 mm left upper lobe nodule. Continued screening with low-dose noncontrast chest CT in 12 months is  recommended.  Moderate upper lung predominant emphysema.    Patient encouraged to quit smoking.      Incidental findings  to be followed by PCP   Mild/minimal coronary artery calcification.  Ill-defined 1.8 cm hypodense lesion in the right hepatic lobe,  incompletely characterized but stable compared to prior study.  4.3 x 4.0 cm hypodense lesion in the superior pole of the left kidney  likely a cyst.

## 2024-04-18 NOTE — PROGRESS NOTES
Subjective   Patient ID: Taylor Cardoso is a 69 y.o. female who presents for Follow-up (Taylor Cardoso has a follow up visit with a CT scan prior./).  HPI 69-year-old female presents today for lung nodule clinic.    Current every day smoker and has smoked for about 50 years. She used to smokes two packs per day and for the past few months has only smoked about 4-5 cigarettes per day. No personal history or family history of cancer.    Telephone visit between Taylor Cardoso and Angelika Rivera CNP at [facility] lung nodule clinic per patient request.    5/11/2024 CT lung screening low-dose dated 4/11/2024  There is moderate bilateral upper lung predominant centrilobular and  paraseptal emphysema.There is no focal consolidation, pleural  effusion, or pneumothorax.    3 mm left upper lobe nodule, image 65/287, stable.     Low-dose CT chest. April 7, 2023  Nodules as described below are annotated on PACS series 3.  2 mm left upper lobe nodule on image 72.  1-2 mm right upper lobe nodule on image 88.  2-3 mm right lower lobe nodule on image 255.    Review of Systems  Review of systems: Present-feeling well. Not present-chills, fatigue and fever.  Respiratory: Not present-difficulty breathing, cough, bloody sputum.  Cardiovascular: Not present-chest pain, palpitations, dyspnea on exertion.  Objective   There were no vitals taken for this visit.   Assessment/Plan   Diagnoses and all orders for this visit:  Lung nodules  -     CT lung screening low dose; Future  Smoker  -     CT lung screening low dose; Future

## 2024-04-24 ENCOUNTER — OFFICE VISIT (OUTPATIENT)
Dept: PRIMARY CARE | Facility: CLINIC | Age: 69
End: 2024-04-24
Payer: MEDICARE

## 2024-04-24 VITALS
HEART RATE: 78 BPM | BODY MASS INDEX: 21.79 KG/M2 | SYSTOLIC BLOOD PRESSURE: 116 MMHG | WEIGHT: 123 LBS | DIASTOLIC BLOOD PRESSURE: 56 MMHG | OXYGEN SATURATION: 98 %

## 2024-04-24 DIAGNOSIS — F51.01 PRIMARY INSOMNIA: Primary | ICD-10-CM

## 2024-04-24 PROBLEM — R91.8 LUNG FIELD ABNORMAL: Status: ACTIVE | Noted: 2024-04-24

## 2024-04-24 PROCEDURE — 1160F RVW MEDS BY RX/DR IN RCRD: CPT | Performed by: STUDENT IN AN ORGANIZED HEALTH CARE EDUCATION/TRAINING PROGRAM

## 2024-04-24 PROCEDURE — 99213 OFFICE O/P EST LOW 20 MIN: CPT | Performed by: STUDENT IN AN ORGANIZED HEALTH CARE EDUCATION/TRAINING PROGRAM

## 2024-04-24 PROCEDURE — 3074F SYST BP LT 130 MM HG: CPT | Performed by: STUDENT IN AN ORGANIZED HEALTH CARE EDUCATION/TRAINING PROGRAM

## 2024-04-24 PROCEDURE — 3078F DIAST BP <80 MM HG: CPT | Performed by: STUDENT IN AN ORGANIZED HEALTH CARE EDUCATION/TRAINING PROGRAM

## 2024-04-24 PROCEDURE — 1125F AMNT PAIN NOTED PAIN PRSNT: CPT | Performed by: STUDENT IN AN ORGANIZED HEALTH CARE EDUCATION/TRAINING PROGRAM

## 2024-04-24 PROCEDURE — 1159F MED LIST DOCD IN RCRD: CPT | Performed by: STUDENT IN AN ORGANIZED HEALTH CARE EDUCATION/TRAINING PROGRAM

## 2024-04-24 RX ORDER — ZOLPIDEM TARTRATE 12.5 MG/1
12.5 TABLET, FILM COATED, EXTENDED RELEASE ORAL NIGHTLY PRN
Qty: 90 TABLET | Refills: 1 | Status: SHIPPED | OUTPATIENT
Start: 2024-04-24 | End: 2024-10-21

## 2024-04-24 ASSESSMENT — ENCOUNTER SYMPTOMS: DEPRESSION: 0

## 2024-04-24 ASSESSMENT — PAIN SCALES - GENERAL: PAINLEVEL: 5

## 2024-04-24 NOTE — PROGRESS NOTES
Subjective   Patient ID: Taylor Cardoso is a 69 y.o. female who presents for Medication Problem (Ambien is not working for her anymore.) and Results (Follow up CT scan results.).    HPI comes in for med refill would like to adjust her dosing as the 10 mg per MA is not working as well used to    Review of Systems  Constitutional: NO F, chills, or sweats  Eyes: no blurred vision or visual disturbance  ENT: no hearing loss, no congestion, no nasal discharge, no hoarseness and no sore throat.   Cardiovascular: no chest pain, no edema, no palps and no syncope.   Respiratory: no cough,no s.o.b. and no wheezing  Gastrointestinal: no abdominal pain, No C/D no N/V, no blood in stools  Genitourinary: no dysuria, no change in urinary frequency, no urinary hesitancy and no feelings of urinary urgency.   Musculoskeletal: no arthralgias,  no back pain and no myalgias.   Integumentary: no new skin lesions and no rashes.   Neurological: no difficulty walking, no headache, no limb weakness, no numbness and no tingling.   Psychiatric: Chronic insomnia  Objective   /56 (BP Location: Right arm, Patient Position: Sitting, BP Cuff Size: Adult)   Pulse 78   Wt 55.8 kg (123 lb)   SpO2 98%   BMI 21.79 kg/m²     Physical Exam  gen- a & o x 3, nad, pleasant    Assessment/Plan     1.  She is getting inconsistent sleep with her Ambien 10 mg which she has been on for many years.  We will adjust the dose to 12.5 mg extended release to use as needed before bed.  She is up-to-date with agreement forms and drug screen as of January 2024.  You have been prescribed a schedule 4 controlled substance for sleep, examples are ambien, sonata, restoril, lunesta, and belsomra. The long term use of this medication can be addicting, however is needed in some conditions. By law, you can be prescribed a 1 month supply with 5 refills. After that if refills are needed there will be a required office visit, there will be no exceptions. You will be drug  tested on a regular basis. A prescriber report will be ran each time you need a refill. You may not use any illegal drugs, and may not get controlled medications prescribed from more than 1 doctor. If there are any abnormalities on your drug testing, and if any other physicians are found to have been prescribing you controlled substances, our office will no longer help with refills in the future. There will be no exceptions. It is your responsibility to take your medications properly, it is your responsibility to make office visits in advance when refills may be needed, and it is your responsibility to not take any illegal drugs, as well as any controlled medications from another doctor. If you fail in your responsibilities, our office will discontinue your medication with no questions and no exceptions.  If you feel that you are addicted to your medication and would like to improve yourself and cut back, you may contact the [place] [facility], Their number [phone] toll-free. Your call is confidential. They will refer you to someone for help. You can call them from Monday through Friday between 8 a.m. and 5 p.m.

## 2024-04-24 NOTE — PATIENT INSTRUCTIONS
1.  She is getting inconsistent sleep with her Ambien 10 mg which she has been on for many years.  We will adjust the dose to 12.5 mg extended release to use as needed before bed.  She is up-to-date with agreement forms and drug screen as of January 2024.  You have been prescribed a schedule 4 controlled substance for sleep, examples are ambien, sonata, restoril, lunesta, and belsomra. The long term use of this medication can be addicting, however is needed in some conditions. By law, you can be prescribed a 1 month supply with 5 refills. After that if refills are needed there will be a required office visit, there will be no exceptions. You will be drug tested on a regular basis. A prescriber report will be ran each time you need a refill. You may not use any illegal drugs, and may not get controlled medications prescribed from more than 1 doctor. If there are any abnormalities on your drug testing, and if any other physicians are found to have been prescribing you controlled substances, our office will no longer help with refills in the future. There will be no exceptions. It is your responsibility to take your medications properly, it is your responsibility to make office visits in advance when refills may be needed, and it is your responsibility to not take any illegal drugs, as well as any controlled medications from another doctor. If you fail in your responsibilities, our office will discontinue your medication with no questions and no exceptions.  If you feel that you are addicted to your medication and would like to improve yourself and cut back, you may contact the [place] [facility], Their number [phone] toll-free. Your call is confidential. They will refer you to someone for help. You can call them from Monday through Friday between 8 a.m. and 5 p.m.

## 2024-05-22 ENCOUNTER — TELEPHONE (OUTPATIENT)
Dept: PRIMARY CARE | Facility: CLINIC | Age: 69
End: 2024-05-22
Payer: MEDICARE

## 2024-05-22 NOTE — TELEPHONE ENCOUNTER
Lvm: re: liver  Ill-defined 1.8 cm hypodense lesion in the right hepatic lobe,  incompletely characterized but stable compared to prior study.    Please follow up PCP for further management.

## 2024-07-19 ENCOUNTER — APPOINTMENT (OUTPATIENT)
Dept: PRIMARY CARE | Facility: CLINIC | Age: 69
End: 2024-07-19
Payer: MEDICARE

## 2024-07-19 VITALS
DIASTOLIC BLOOD PRESSURE: 67 MMHG | OXYGEN SATURATION: 96 % | RESPIRATION RATE: 17 BRPM | BODY MASS INDEX: 20.55 KG/M2 | HEART RATE: 72 BPM | TEMPERATURE: 97.2 F | SYSTOLIC BLOOD PRESSURE: 113 MMHG | HEIGHT: 63 IN | WEIGHT: 116 LBS

## 2024-07-19 DIAGNOSIS — F17.200 TOBACCO DEPENDENCE SYNDROME: ICD-10-CM

## 2024-07-19 DIAGNOSIS — R29.818 TRANSIENT NEUROLOGICAL SYMPTOMS: ICD-10-CM

## 2024-07-19 DIAGNOSIS — R91.8 MULTIPLE PULMONARY NODULES: Primary | ICD-10-CM

## 2024-07-19 PROCEDURE — 3078F DIAST BP <80 MM HG: CPT | Performed by: FAMILY MEDICINE

## 2024-07-19 PROCEDURE — 99214 OFFICE O/P EST MOD 30 MIN: CPT | Performed by: FAMILY MEDICINE

## 2024-07-19 PROCEDURE — 3074F SYST BP LT 130 MM HG: CPT | Performed by: FAMILY MEDICINE

## 2024-07-19 PROCEDURE — 1126F AMNT PAIN NOTED NONE PRSNT: CPT | Performed by: FAMILY MEDICINE

## 2024-07-19 PROCEDURE — 3008F BODY MASS INDEX DOCD: CPT | Performed by: FAMILY MEDICINE

## 2024-07-19 ASSESSMENT — PATIENT HEALTH QUESTIONNAIRE - PHQ9
1. LITTLE INTEREST OR PLEASURE IN DOING THINGS: NOT AT ALL
SUM OF ALL RESPONSES TO PHQ9 QUESTIONS 1 AND 2: 0
2. FEELING DOWN, DEPRESSED OR HOPELESS: NOT AT ALL

## 2024-07-19 ASSESSMENT — PAIN SCALES - GENERAL: PAINLEVEL: 0-NO PAIN

## 2024-07-19 NOTE — PROGRESS NOTES
"Subjective   Patient ID: Taylor Cardoso is a 69 y.o. female who presents for New Patient Visit, Establish Care, and Numbness (Right Arm/ Left side of face x 3 weeks).    HPI     Left facial numbness and left arm numbness for years  Frequent for 3 weeks, resolve  in 15 minutes or less, no weakness.  Denies facial droop, altered speech, HA    Insomia:  increased Ambien to 12.5 mg, did not start yet due out of pocket expense.        Review of Systems   All other systems reviewed and are negative.      Objective   /67 (BP Location: Left arm, Patient Position: Sitting, BP Cuff Size: Adult)   Pulse 72   Temp 36.2 °C (97.2 °F) (Temporal)   Resp 17   Ht 1.6 m (5' 3\")   Wt 52.6 kg (116 lb)   SpO2 96%   BMI 20.55 kg/m²     Physical Exam  Vitals and nursing note reviewed.   Cardiovascular:      Rate and Rhythm: Normal rate and regular rhythm.   Pulmonary:      Effort: Pulmonary effort is normal.      Breath sounds: Normal breath sounds.   Musculoskeletal:      Cervical back: Neck supple.   Lymphadenopathy:      Cervical: No cervical adenopathy.   Neurological:      Mental Status: She is alert and oriented to person, place, and time.      Cranial Nerves: No dysarthria or facial asymmetry.      Sensory: Sensation is intact.      Motor: Motor function is intact.      Gait: Gait is intact.      Deep Tendon Reflexes: Reflexes are normal and symmetric.   Psychiatric:         Mood and Affect: Mood normal.         Behavior: Behavior normal.         Thought Content: Thought content normal.         Judgment: Judgment normal.         Assessment/Plan   Diagnoses and all orders for this visit:  Multiple pulmonary nodules  Transient neurological symptoms  Tobacco dependence syndrome  Other orders  -     Follow Up In Primary Care - Established; Future       "

## 2025-01-24 ENCOUNTER — APPOINTMENT (OUTPATIENT)
Dept: PRIMARY CARE | Facility: CLINIC | Age: 70
End: 2025-01-24
Payer: MEDICARE

## 2025-04-17 ENCOUNTER — HOSPITAL ENCOUNTER (OUTPATIENT)
Dept: RADIOLOGY | Facility: CLINIC | Age: 70
Discharge: HOME | End: 2025-04-17
Payer: MEDICARE

## 2025-04-17 DIAGNOSIS — F17.200 SMOKER: ICD-10-CM

## 2025-04-17 DIAGNOSIS — R91.8 LUNG NODULES: ICD-10-CM

## 2025-04-17 PROCEDURE — 71271 CT THORAX LUNG CANCER SCR C-: CPT

## 2025-04-21 ENCOUNTER — TELEPHONE (OUTPATIENT)
Dept: PRIMARY CARE | Facility: CLINIC | Age: 70
End: 2025-04-21
Payer: MEDICARE

## 2025-04-25 DIAGNOSIS — F17.210 CIGARETTE SMOKER: ICD-10-CM

## 2025-04-25 DIAGNOSIS — F17.200 SMOKER: Primary | ICD-10-CM

## 2025-05-08 ENCOUNTER — APPOINTMENT (OUTPATIENT)
Dept: PRIMARY CARE | Facility: CLINIC | Age: 70
End: 2025-05-08
Payer: MEDICARE

## 2025-05-08 VITALS
SYSTOLIC BLOOD PRESSURE: 119 MMHG | OXYGEN SATURATION: 99 % | BODY MASS INDEX: 21.26 KG/M2 | HEIGHT: 63 IN | DIASTOLIC BLOOD PRESSURE: 75 MMHG | TEMPERATURE: 96.9 F | WEIGHT: 120 LBS | HEART RATE: 69 BPM

## 2025-05-08 DIAGNOSIS — Z12.31 ENCOUNTER FOR SCREENING MAMMOGRAM FOR MALIGNANT NEOPLASM OF BREAST: ICD-10-CM

## 2025-05-08 DIAGNOSIS — Z13.29 THYROID DISORDER SCREEN: ICD-10-CM

## 2025-05-08 DIAGNOSIS — R73.9 HYPERGLYCEMIA: ICD-10-CM

## 2025-05-08 DIAGNOSIS — M19.90 ARTHRITIS: ICD-10-CM

## 2025-05-08 DIAGNOSIS — I10 ESSENTIAL HYPERTENSION WITH GOAL BLOOD PRESSURE LESS THAN 130/80: ICD-10-CM

## 2025-05-08 DIAGNOSIS — F41.9 ANXIETY: ICD-10-CM

## 2025-05-08 DIAGNOSIS — Z00.00 WELLNESS EXAMINATION: Primary | ICD-10-CM

## 2025-05-08 DIAGNOSIS — E78.5 HYPERLIPIDEMIA, UNSPECIFIED HYPERLIPIDEMIA TYPE: ICD-10-CM

## 2025-05-08 DIAGNOSIS — E55.9 VITAMIN D DEFICIENCY: ICD-10-CM

## 2025-05-08 PROCEDURE — 1125F AMNT PAIN NOTED PAIN PRSNT: CPT | Performed by: NURSE PRACTITIONER

## 2025-05-08 PROCEDURE — 3078F DIAST BP <80 MM HG: CPT | Performed by: NURSE PRACTITIONER

## 2025-05-08 PROCEDURE — 1159F MED LIST DOCD IN RCRD: CPT | Performed by: NURSE PRACTITIONER

## 2025-05-08 PROCEDURE — 99397 PER PM REEVAL EST PAT 65+ YR: CPT | Performed by: NURSE PRACTITIONER

## 2025-05-08 PROCEDURE — 3074F SYST BP LT 130 MM HG: CPT | Performed by: NURSE PRACTITIONER

## 2025-05-08 PROCEDURE — 1123F ACP DISCUSS/DSCN MKR DOCD: CPT | Performed by: NURSE PRACTITIONER

## 2025-05-08 PROCEDURE — 1158F ADVNC CARE PLAN TLK DOCD: CPT | Performed by: NURSE PRACTITIONER

## 2025-05-08 PROCEDURE — 3008F BODY MASS INDEX DOCD: CPT | Performed by: NURSE PRACTITIONER

## 2025-05-08 RX ORDER — FLUOXETINE HYDROCHLORIDE 20 MG/1
3 CAPSULE ORAL
COMMUNITY
Start: 2025-04-13

## 2025-05-08 ASSESSMENT — ENCOUNTER SYMPTOMS
DEPRESSION: 0
ARTHRALGIAS: 1

## 2025-05-08 ASSESSMENT — PAIN SCALES - GENERAL: PAINLEVEL_OUTOF10: 6

## 2025-05-08 NOTE — PROGRESS NOTES
"Subjective   Patient ID: Taylor Cardoso is a 70 y.o. female who presents for Establish Care (Left leg/hip pain).    HPI     Patient presents to clinic to establish care.  Previous patient of Dr. Shook.    Patient past medical history of asthma hypertension, hyperlipidemia, anxiety depression, insomnia    She states she has been doing well with the exception of left thigh and hip pain x 2 weeks.  She describes it as an aching pain.  She states pain is worse after sitting.  Denies trauma or injury.    Patient is a everyday cigarette smoker 6 to 7 cigarettes/day x 40 years.  Patient currently having yearly CT lung scans evaluating for lung nodules.    Appetite normal bowel and bladder normal.      Review of Systems   Musculoskeletal:  Positive for arthralgias.   All other systems reviewed and are negative.      Objective   /75 (BP Location: Right arm, Patient Position: Sitting, BP Cuff Size: Adult)   Pulse 69   Temp 36.1 °C (96.9 °F) (Temporal)   Ht 1.6 m (5' 3\")   Wt 54.4 kg (120 lb)   SpO2 99%   BMI 21.26 kg/m²     Physical Exam  Vitals reviewed.   Constitutional:       General: She is not in acute distress.     Appearance: Normal appearance. She is not ill-appearing.   HENT:      Right Ear: Tympanic membrane and ear canal normal.      Left Ear: Tympanic membrane and ear canal normal.      Mouth/Throat:      Mouth: Mucous membranes are moist.      Pharynx: Oropharynx is clear.   Eyes:      Conjunctiva/sclera: Conjunctivae normal.      Pupils: Pupils are equal, round, and reactive to light.   Cardiovascular:      Rate and Rhythm: Normal rate and regular rhythm.   Pulmonary:      Effort: Pulmonary effort is normal.      Breath sounds: Normal breath sounds.   Abdominal:      General: Bowel sounds are normal.      Palpations: Abdomen is soft.   Musculoskeletal:         General: Normal range of motion.      Cervical back: Normal range of motion and neck supple.   Skin:     General: Skin is warm and dry. "   Neurological:      General: No focal deficit present.      Mental Status: She is alert and oriented to person, place, and time.   Psychiatric:         Mood and Affect: Mood normal.         Thought Content: Thought content normal.         Assessment/Plan   Diagnoses and all orders for this visit:  Wellness examination  -Unremarkable physical exam  -Labs ordered today in clinic  -Mammogram ordered today in clinic  -Follow-up for yearly exam  Essential hypertension with goal blood pressure less than 130/80  - Continue amlodipine 5 mg, enalapril 20 mg daily  Hyperlipidemia, unspecified hyperlipidemia type  -Continue atorvastatin 40 mg daily, aspirin 81 mg daily.  -     CBC and Auto Differential; Future  -     Comprehensive Metabolic Panel; Future  -     Lipid Panel; Future  ANXIETY  - Continue fluoxetine, Zyprexa as prescribed  Encounter for screening mammogram for malignant neoplasm of breast  -     BI mammo bilateral screening tomosynthesis; Future  Hyperglycemia  -     Hemoglobin A1C; Future  Thyroid disorder screen  -     TSH with reflex to Free T4 if abnormal; Future  Vitamin D deficiency  -     Vitamin D 25-Hydroxy,Total (for eval of Vitamin D levels); Future

## 2025-05-08 NOTE — PATIENT INSTRUCTIONS
Good Seeing you today.  Continue medications as prescribed.  Healthy diet and Drink plenty of water-UNLESS RESTRICTIONS.  Regular exercises.  Labs ORDERED TODAY in clinic.  Please see [facility] for results.  Will notify you with abnormal results only by telephone or [facility].  If you have any question about your labs do not hesitate to give our office a call.    If you do not have access to [facility] our office will notify you with any abnormal results.  Tylenol arthritis as needed for your hip pain.  Aspercreme as needed.  Follow-up in 4 MONTHS FOLLOW-UP   Call office any new concerns.

## 2025-06-03 ENCOUNTER — HOSPITAL ENCOUNTER (OUTPATIENT)
Dept: RADIOLOGY | Facility: CLINIC | Age: 70
Discharge: HOME | End: 2025-06-03
Payer: MEDICARE

## 2025-06-03 VITALS — HEIGHT: 63 IN | WEIGHT: 119.93 LBS | BODY MASS INDEX: 21.25 KG/M2

## 2025-06-03 DIAGNOSIS — Z12.31 ENCOUNTER FOR SCREENING MAMMOGRAM FOR MALIGNANT NEOPLASM OF BREAST: ICD-10-CM

## 2025-06-03 PROCEDURE — 77063 BREAST TOMOSYNTHESIS BI: CPT | Performed by: RADIOLOGY

## 2025-06-03 PROCEDURE — 77067 SCR MAMMO BI INCL CAD: CPT

## 2025-06-03 PROCEDURE — 77067 SCR MAMMO BI INCL CAD: CPT | Performed by: RADIOLOGY

## 2025-08-14 ENCOUNTER — APPOINTMENT (OUTPATIENT)
Dept: PRIMARY CARE | Facility: CLINIC | Age: 70
End: 2025-08-14
Payer: MEDICARE

## 2025-08-21 ENCOUNTER — APPOINTMENT (OUTPATIENT)
Dept: PRIMARY CARE | Facility: CLINIC | Age: 70
End: 2025-08-21
Payer: MEDICARE

## 2025-08-21 VITALS
WEIGHT: 118 LBS | BODY MASS INDEX: 20.91 KG/M2 | TEMPERATURE: 96.9 F | SYSTOLIC BLOOD PRESSURE: 111 MMHG | DIASTOLIC BLOOD PRESSURE: 71 MMHG | HEIGHT: 63 IN

## 2025-08-21 DIAGNOSIS — K21.9 GASTROESOPHAGEAL REFLUX DISEASE WITHOUT ESOPHAGITIS: Primary | ICD-10-CM

## 2025-08-21 PROCEDURE — 3074F SYST BP LT 130 MM HG: CPT | Performed by: NURSE PRACTITIONER

## 2025-08-21 PROCEDURE — 1126F AMNT PAIN NOTED NONE PRSNT: CPT | Performed by: NURSE PRACTITIONER

## 2025-08-21 PROCEDURE — 99214 OFFICE O/P EST MOD 30 MIN: CPT | Performed by: NURSE PRACTITIONER

## 2025-08-21 PROCEDURE — 1159F MED LIST DOCD IN RCRD: CPT | Performed by: NURSE PRACTITIONER

## 2025-08-21 PROCEDURE — 3078F DIAST BP <80 MM HG: CPT | Performed by: NURSE PRACTITIONER

## 2025-08-21 PROCEDURE — 3008F BODY MASS INDEX DOCD: CPT | Performed by: NURSE PRACTITIONER

## 2025-08-21 RX ORDER — ZOLPIDEM TARTRATE 5 MG/1
5 TABLET ORAL NIGHTLY PRN
COMMUNITY
Start: 2025-08-04

## 2025-08-21 RX ORDER — PANTOPRAZOLE SODIUM 40 MG/1
40 TABLET, DELAYED RELEASE ORAL DAILY
Qty: 30 TABLET | Refills: 3 | Status: SHIPPED | OUTPATIENT
Start: 2025-08-21 | End: 2025-12-19

## 2025-08-21 RX ORDER — BUDESONIDE, GLYCOPYRROLATE, AND FORMOTEROL FUMARATE 160; 9; 4.8 UG/1; UG/1; UG/1
AEROSOL, METERED RESPIRATORY (INHALATION)
COMMUNITY
Start: 2024-11-18

## 2025-08-21 RX ORDER — PANTOPRAZOLE SODIUM 40 MG/1
40 TABLET, DELAYED RELEASE ORAL DAILY
Qty: 30 TABLET | Refills: 3 | Status: SHIPPED | OUTPATIENT
Start: 2025-08-21 | End: 2025-08-21 | Stop reason: SDUPTHER

## 2025-08-21 ASSESSMENT — ENCOUNTER SYMPTOMS
DIARRHEA: 0
NAUSEA: 0
ABDOMINAL DISTENTION: 0
VOMITING: 0
BLOOD IN STOOL: 0
DEPRESSION: 0

## 2025-08-21 ASSESSMENT — PAIN SCALES - GENERAL: PAINLEVEL_OUTOF10: 0-NO PAIN

## 2025-08-22 ASSESSMENT — ENCOUNTER SYMPTOMS
RESPIRATORY NEGATIVE: 1
CARDIOVASCULAR NEGATIVE: 1
CONSTITUTIONAL NEGATIVE: 1

## 2025-08-29 LAB
25(OH)D3+25(OH)D2 SERPL-MCNC: 36 NG/ML (ref 30–100)
ALBUMIN SERPL-MCNC: 4.2 G/DL (ref 3.6–5.1)
ALP SERPL-CCNC: 146 U/L (ref 37–153)
ALT SERPL-CCNC: 33 U/L (ref 6–29)
ANION GAP SERPL CALCULATED.4IONS-SCNC: 7 MMOL/L (CALC) (ref 7–17)
AST SERPL-CCNC: 40 U/L (ref 10–35)
BASOPHILS # BLD AUTO: 42 CELLS/UL (ref 0–200)
BASOPHILS NFR BLD AUTO: 0.5 %
BILIRUB SERPL-MCNC: 0.6 MG/DL (ref 0.2–1.2)
BUN SERPL-MCNC: 15 MG/DL (ref 7–25)
CALCIUM SERPL-MCNC: 9 MG/DL (ref 8.6–10.4)
CHLORIDE SERPL-SCNC: 106 MMOL/L (ref 98–110)
CHOLEST SERPL-MCNC: 166 MG/DL
CHOLEST/HDLC SERPL: 3.2 (CALC)
CO2 SERPL-SCNC: 27 MMOL/L (ref 20–32)
CREAT SERPL-MCNC: 0.69 MG/DL (ref 0.5–1.05)
EGFRCR SERPLBLD CKD-EPI 2021: 94 ML/MIN/1.73M2
EOSINOPHIL # BLD AUTO: 83 CELLS/UL (ref 15–500)
EOSINOPHIL NFR BLD AUTO: 1 %
ERYTHROCYTE [DISTWIDTH] IN BLOOD BY AUTOMATED COUNT: 14.7 % (ref 11–15)
EST. AVERAGE GLUCOSE BLD GHB EST-MCNC: 123 MG/DL
EST. AVERAGE GLUCOSE BLD GHB EST-SCNC: 6.8 MMOL/L
GLUCOSE SERPL-MCNC: 85 MG/DL (ref 65–99)
HBA1C MFR BLD: 5.9 %
HCT VFR BLD AUTO: 40.2 % (ref 35–45)
HDLC SERPL-MCNC: 52 MG/DL
HGB BLD-MCNC: 13.1 G/DL (ref 11.7–15.5)
LDLC SERPL CALC-MCNC: 97 MG/DL (CALC)
LYMPHOCYTES # BLD AUTO: 2125 CELLS/UL (ref 850–3900)
LYMPHOCYTES NFR BLD AUTO: 25.6 %
MCH RBC QN AUTO: 28.9 PG (ref 27–33)
MCHC RBC AUTO-ENTMCNC: 32.6 G/DL (ref 32–36)
MCV RBC AUTO: 88.7 FL (ref 80–100)
MONOCYTES # BLD AUTO: 498 CELLS/UL (ref 200–950)
MONOCYTES NFR BLD AUTO: 6 %
NEUTROPHILS # BLD AUTO: 5553 CELLS/UL (ref 1500–7800)
NEUTROPHILS NFR BLD AUTO: 66.9 %
NONHDLC SERPL-MCNC: 114 MG/DL (CALC)
PLATELET # BLD AUTO: 394 THOUSAND/UL (ref 140–400)
PMV BLD REES-ECKER: 8.6 FL (ref 7.5–12.5)
POTASSIUM SERPL-SCNC: 4.2 MMOL/L (ref 3.5–5.3)
PROT SERPL-MCNC: 6.5 G/DL (ref 6.1–8.1)
RBC # BLD AUTO: 4.53 MILLION/UL (ref 3.8–5.1)
SODIUM SERPL-SCNC: 140 MMOL/L (ref 135–146)
TRIGL SERPL-MCNC: 82 MG/DL
TSH SERPL-ACNC: 1.57 MIU/L (ref 0.4–4.5)
WBC # BLD AUTO: 8.3 THOUSAND/UL (ref 3.8–10.8)

## 2026-02-23 ENCOUNTER — APPOINTMENT (OUTPATIENT)
Dept: PRIMARY CARE | Facility: CLINIC | Age: 71
End: 2026-02-23
Payer: MEDICARE